# Patient Record
Sex: FEMALE | Race: WHITE
[De-identification: names, ages, dates, MRNs, and addresses within clinical notes are randomized per-mention and may not be internally consistent; named-entity substitution may affect disease eponyms.]

---

## 2017-03-21 ENCOUNTER — HOSPITAL ENCOUNTER (OUTPATIENT)
Dept: HOSPITAL 74 - JASU-SURG | Age: 52
Discharge: HOME | End: 2017-03-21
Attending: UROLOGY
Payer: COMMERCIAL

## 2017-03-21 VITALS — TEMPERATURE: 98 F

## 2017-03-21 VITALS — SYSTOLIC BLOOD PRESSURE: 129 MMHG | HEART RATE: 71 BPM | DIASTOLIC BLOOD PRESSURE: 64 MMHG

## 2017-03-21 VITALS — BODY MASS INDEX: 23.4 KG/M2

## 2017-03-21 DIAGNOSIS — N20.0: Primary | ICD-10-CM

## 2017-03-21 PROCEDURE — 0TF4XZZ FRAGMENTATION IN LEFT KIDNEY PELVIS, EXTERNAL APPROACH: ICD-10-PCS | Performed by: UROLOGY

## 2017-03-21 NOTE — OP
DATE OF OPERATION:  03/21/2017

 

PREOPERATIVE DIAGNOSIS:  Left renal calculus.

 

POSTOPERATIVE DIAGNOSIS:  Left renal calculus.

 

PROCEDURE:  Left extracorporeal shock wave lithotripsy.

 

HISTORY:  This is a very pleasant 51-year-old female with history of recurrent stones

who has lithotripsies over the past approximately 5 years.  She now presented with

left renal calculus which has been increasing in size.  The stone was measured at

approximately 6 mm and lithotripsy was recommended.  Risks and benefits of treatment

options including continued observation were discussed with the patient, who elected

to undergo the above stated procedure.  All questions answered.

 

BRIEF OPERATIVE NOTE:  Patient brought to the operating room, placed in the supine

position.  Once the stone was localized using 3-D fluoroscopy as well as sonography,

the patient was given sedation.  Intravenous antibiotics were given.

 

At this time, approximately 2500 shocks were delivered _____ fashion.  The stone

appeared to fragment radiographically.  The patient tolerated procedure well, was

brought to recovery room in stable, satisfactory condition.  

 

ABUNDIO ESQUIVEL M.D.

[id number]

DD: 03/21/2017 10:49

DT: 03/21/2017 23:13

Job #:  [id number]

## 2017-03-21 NOTE — OP
Operative Note





- Note:


Operative Date: 03/21/17


Pre-Operative Diagnosis: Left renal calculi


Operation: Left eswl


Findings: 


left 6mm and 3mm lp stone


Post-Operative Diagnosis: Same as Pre-op


Surgeon: Eduardo Baeza MD.


Anesthesia: General

## 2020-12-25 ENCOUNTER — HOSPITAL ENCOUNTER (EMERGENCY)
Dept: HOSPITAL 74 - JER | Age: 55
LOS: 1 days | Discharge: HOME | End: 2020-12-26
Payer: COMMERCIAL

## 2020-12-25 VITALS — BODY MASS INDEX: 24.4 KG/M2

## 2020-12-25 DIAGNOSIS — S50.01XA: Primary | ICD-10-CM

## 2020-12-25 DIAGNOSIS — R07.9: ICD-10-CM
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
## 2020-12-26 VITALS — DIASTOLIC BLOOD PRESSURE: 84 MMHG | TEMPERATURE: 98.2 F | HEART RATE: 88 BPM | SYSTOLIC BLOOD PRESSURE: 126 MMHG
